# Patient Record
Sex: FEMALE | Race: WHITE | Employment: UNEMPLOYED | URBAN - METROPOLITAN AREA
[De-identification: names, ages, dates, MRNs, and addresses within clinical notes are randomized per-mention and may not be internally consistent; named-entity substitution may affect disease eponyms.]

---

## 2018-10-16 ENCOUNTER — ROUTINE PRENATAL (OUTPATIENT)
Dept: OBGYN CLINIC | Age: 33
End: 2018-10-16

## 2018-10-16 ENCOUNTER — OFFICE VISIT (OUTPATIENT)
Dept: OBGYN CLINIC | Age: 33
End: 2018-10-16

## 2018-10-16 VITALS
SYSTOLIC BLOOD PRESSURE: 96 MMHG | HEIGHT: 67 IN | WEIGHT: 187.4 LBS | RESPIRATION RATE: 18 BRPM | HEART RATE: 76 BPM | OXYGEN SATURATION: 100 % | DIASTOLIC BLOOD PRESSURE: 70 MMHG | BODY MASS INDEX: 29.41 KG/M2

## 2018-10-16 VITALS
RESPIRATION RATE: 18 BRPM | SYSTOLIC BLOOD PRESSURE: 96 MMHG | WEIGHT: 187.4 LBS | HEIGHT: 67 IN | DIASTOLIC BLOOD PRESSURE: 70 MMHG | HEART RATE: 76 BPM | BODY MASS INDEX: 29.41 KG/M2 | OXYGEN SATURATION: 100 %

## 2018-10-16 DIAGNOSIS — N91.2 AMENORRHEA: ICD-10-CM

## 2018-10-16 DIAGNOSIS — N92.6 MISSED MENSES: Primary | ICD-10-CM

## 2018-10-16 DIAGNOSIS — Z3A.31 31 WEEKS GESTATION OF PREGNANCY: ICD-10-CM

## 2018-10-16 LAB
BILIRUB UR QL STRIP: NEGATIVE
GLUCOSE UR-MCNC: NEGATIVE MG/DL
KETONES P FAST UR STRIP-MCNC: NEGATIVE MG/DL
PH UR STRIP: 7 [PH] (ref 4.6–8)
PROT UR QL STRIP: NEGATIVE
SP GR UR STRIP: 1.01 (ref 1–1.03)
UA UROBILINOGEN AMB POC: NORMAL (ref 0.2–1)
URINALYSIS CLARITY POC: CLEAR
URINALYSIS COLOR POC: YELLOW
URINE BLOOD POC: NEGATIVE
URINE LEUKOCYTES POC: NORMAL
URINE NITRITES POC: NEGATIVE

## 2018-10-16 NOTE — MR AVS SNAPSHOT
[address] [id number] [phone] Patient: Greta Spring MRN: [id number] PPV:0/84/1660 Visit Information Date & Time Provider Department Dept. Phone Encounter #  
 10/16/2018  3:00 PM Tricia Licona [phone] [id number] Follow-up Instructions Return in about 2 weeks (around 10/30/2018) for Routine OB Visit on rotation. Upcoming Health Maintenance Date Due  
 PAP AKA CERVICAL CYTOLOGY 3/24/2007 Influenza Age 5 to Adult 8/1/2018 OB 3RD TRIMESTER TDAP 9/12/2018 Allergies as of 10/16/2018  Review Complete On: 10/16/2018 By: Kaiser Burton LPN No Known Allergies Current Immunizations  Never Reviewed Name Date Tdap  Incomplete Not reviewed this visit You Were Diagnosed With   
  
 Codes Comments Missed menses    -  Primary ICD-10-CM: N92.6 ICD-9-CM: 626.4 Amenorrhea     ICD-10-CM: N91.2 ICD-9-CM: 626.0 31 weeks gestation of pregnancy     ICD-10-CM: Z3A.31 
ICD-9-CM: V22.2 Vitals BP Pulse Resp Height(growth percentile) Weight(growth percentile) LMP  
 96/70 76 18 5' 7\" (1.702 m) 187 lb 6.4 oz (85 kg) 03/14/2018 (Exact Date) SpO2 BMI OB Status 100% 29.35 kg/m2 Pregnant BMI and BSA Data Body Mass Index Body Surface Area  
 29.35 kg/m 2 2 m 2 Your Updated Medication List  
  
   
This list is accurate as of 10/16/18  3:18 PM.  Always use your most recent med list.  
  
  
  
  
 PNV66-Iron Fumarate-FA-DSS-DHA 26-1.2- mg Cap Take  by mouth. We Performed the Following AMB POC URINALYSIS DIP STICK AUTO W/ MICRO [98145 CPT(R)] TETANUS, DIPHTHERIA TOXOIDS AND ACELLULAR PERTUSSIS VACCINE (TDAP), IN INDIVIDS. >=7, IM [id number] CPT(R)] Follow-up Instructions Return in about 2 weeks (around 10/30/2018) for Routine OB Visit on rotation. Introducing [facility]! [facility] introduces Searchspace patient portal. Now you can access parts of your medical record, email your doctor's office, and request medication refills online. 1. In your internet browser, go to [email or web address] 2. Click on the First Time User? Click Here link in the Sign In box. You will see the New Member Sign Up page. 3. Enter your Searchspace Access Code exactly as it appears below. You will not need to use this code after youve completed the sign-up process. If you do not sign up before the expiration date, you must request a new code. · Searchspace Access Code: [email or web address] Expires: 1/14/2019  2:04 PM 
 
4. Enter the last four digits of your Social Security Number (xxxx) and Date of Birth (mm/dd/yyyy) as indicated and click Submit. You will be taken to the next sign-up page. 5. Create a Searchspace ID. This will be your Searchspace login ID and cannot be changed, so think of one that is secure and easy to remember. 6. Create a Searchspace password. You can change your password at any time. 7. Enter your Password Reset Question and Answer. This can be used at a later time if you forget your password. 8. Enter your e-mail address. You will receive e-mail notification when new information is available in [address] 9. Click Sign Up. You can now view and download portions of your medical record. 10. Click the Download Summary menu link to download a portable copy of your medical information. If you have questions, please visit the Frequently Asked Questions section of the Searchspace website. Remember, Searchspace is NOT to be used for urgent needs. For medical emergencies, dial 911. Now available from your iPhone and Android! Please provide this summary of care documentation to your next provider. If you have any questions after today's visit, please call [phone].

## 2018-10-16 NOTE — PROGRESS NOTES
Phil Granados is a 29 y.o. female who presents for routine immunizations. She denies any symptoms , reactions or allergies that would exclude them from being immunized today. Risks and adverse reactions were discussed and the VIS was given to them. All questions were addressed. She was observed for 10 min post injection. There were no reactions observed.     Yoav Turcios LPN

## 2018-10-16 NOTE — PROGRESS NOTES
Ms. Edward Delacruz is a G5   31w6d with Estimated Date of Delivery: 12/12/18 presents to the office for a routine  prenatal visit. She denies contractions, leakage of fluid, or vaginal bleeding. She reports normal fetal movement. Visit Vitals    BP 96/70    Pulse 76    Resp 18    Ht 5' 7\" (1.702 m)    Wt 187 lb 6.4 oz (85 kg)    LMP 03/14/2018 (Exact Date)    SpO2 100%    BMI 29.35 kg/m2       A/P  31w6d IUP, normal prenatal visit. 1. Continue routine prenatal care  2. Strict 3rd trimester precautions given. Advised regarding leakage of fluid, contractions, and vaginal bleeding. Fetal kick count instructions given.   3. F/U in 2 weeks  4. TDap today  used

## 2018-10-30 ENCOUNTER — ROUTINE PRENATAL (OUTPATIENT)
Dept: OBGYN CLINIC | Age: 33
End: 2018-10-30

## 2018-10-30 VITALS
BODY MASS INDEX: 29.66 KG/M2 | HEIGHT: 67 IN | SYSTOLIC BLOOD PRESSURE: 108 MMHG | WEIGHT: 189 LBS | DIASTOLIC BLOOD PRESSURE: 74 MMHG | HEART RATE: 82 BPM

## 2018-10-30 DIAGNOSIS — M25.551 RIGHT HIP PAIN: Primary | ICD-10-CM

## 2018-10-30 NOTE — PATIENT INSTRUCTIONS
Weeks 32 to 34 of Your Pregnancy: Care Instructions  Your Care Instructions    During the last few weeks of your pregnancy, you may have more aches and pains. It's important to rest when you can. Your growing baby is putting more pressure on your bladder. So you may need to urinate more often. Hemorrhoids are also common. These are painful, itchy veins in the rectal area. In the 36th week, most women have a test for group B streptococcus (GBS). GBS is a common bacteria that can live in the vagina and rectum. It can make your baby sick after birth. If you test positive, you will get antibiotics during labor. These will keep your baby from getting the bacteria. You may want to talk with your doctor about banking your baby's umbilical cord blood. This is the blood left in the cord after birth. If you want to save this blood, you must arrange it ahead of time. You can't decide at the last minute. If you haven't already had the Tdap shot during this pregnancy, talk to your doctor about getting it. It will help protect your  against pertussis infection. Follow-up care is a key part of your treatment and safety. Be sure to make and go to all appointments, and call your doctor if you are having problems. It's also a good idea to know your test results and keep a list of the medicines you take. How can you care for yourself at home? Ease hemorrhoids  · Get more liquids, fruits, vegetables, and fiber in your diet. This will help keep your stools soft. · Avoid sitting for too long. Lie on your left side several times a day. · Clean yourself with soft, moist toilet paper. Or you can use witch hazel pads or personal hygiene pads. · If you are uncomfortable, try ice packs. Or you can sit in a warm sitz bath. Do these for 20 minutes at a time, as needed. · Use hydrocortisone cream for pain and itching. Two examples are Anusol and Preparation H Hydrocortisone.   · Ask your doctor about taking an over-the-counter stool softener. Consider breastfeeding  · Experts recommend that women breastfeed for 1 year or longer. Breast milk is the perfect food for babies. · Breast milk is easier for babies to digest than formula. And it is always available, just the right temperature, and free. · Breast milk may help protect your child from some health problems.  babies are less likely than formula-fed babies to:  ? Get ear infections, colds, diarrhea, and pneumonia. ? Be obese or get diabetes later in life. · Women who breastfeed have less bleeding after the birth. Their uteruses also shrink back faster. · Some women who breastfeed lose weight faster. Making milk burns calories. · Breastfeeding can lower your risk of breast cancer, ovarian cancer, and osteoporosis. Decide about circumcision for boys  · As you make this decision, it may help to think about your personal, Yazdanism, and family traditions. You get to decide if you will keep your son's penis natural or if he will be circumcised. · If you decide that you would like to have your baby circumcised, talk with your doctor. You can share your concerns about pain. And you can discuss your preferences for anesthesia. Where can you learn more? Go to http://rea-quan.info/. Enter T970 in the search box to learn more about \"Weeks 32 to 34 of Your Pregnancy: Care Instructions. \"  Current as of: November 21, 2017  Content Version: 11.8  © 8548-2209 [employer]wise, [employer]orporated. Care instructions adapted under license by [employer] (which disclaims liability or warranty for this information). If you have questions about a medical condition or this instruction, always ask your healthcare professional. [address] [address] any warranty or liability for your use of this information.

## 2018-10-30 NOTE — PROGRESS NOTES
33w6d. Pt. Very upset about lack of attention paid to her. She states she has been complaining of groin and right hip pain for months. She is frustrated and feels prenatal care is a waste of time. She notes her right groin and hip are very painful after a long day. She feels that pressure improves the pain. She has tried ice, heat, massage, pregnancy belt and takes Tylenol all day. She denies bleeding or abnormal discharge. No palpable defect noted. TTP on right upper mons pubis. Discussed regular pregnancy discomforts. Referral for PT placed. RTO 2 weeks for GBS. Labor precautions reviewed.

## 2018-11-05 ENCOUNTER — TELEPHONE (OUTPATIENT)
Dept: OBGYN CLINIC | Age: 33
End: 2018-11-05

## 2018-11-05 ENCOUNTER — DOCUMENTATION ONLY (OUTPATIENT)
Dept: OBGYN CLINIC | Age: 33
End: 2018-11-05

## 2018-11-05 RX ORDER — PANTOPRAZOLE SODIUM 20 MG/1
20 TABLET, DELAYED RELEASE ORAL DAILY
Qty: 30 TAB | Refills: 2 | OUTPATIENT
Start: 2018-11-05 | End: 2018-12-05

## 2018-11-05 RX ORDER — ONDANSETRON 8 MG/1
8 TABLET, ORALLY DISINTEGRATING ORAL
Qty: 30 TAB | Refills: 2 | OUTPATIENT
Start: 2018-11-05 | End: 2018-12-05

## 2018-11-05 NOTE — PROGRESS NOTES
Pt. Requesting Zofran and anti- emetic. Has tried OTC meds with no relief. Rx sent for Protonix and Zofran.

## 2018-11-05 NOTE — TELEPHONE ENCOUNTER
Patient would like prescription for heartburn and nausea. Over the counter medications not alleviating the issue. Patient unable to  Tolerate drinking water. Patient stated she had the same issues during previous pregnancy.  Patient stated she has tried all the meds her provider had recommended

## 2018-11-06 ENCOUNTER — TELEPHONE (OUTPATIENT)
Dept: OBGYN CLINIC | Age: 33
End: 2018-11-06

## 2018-11-06 NOTE — TELEPHONE ENCOUNTER
Patient called stating that she was at her pharmacy to  her medication and it was not there. (Protonix) I informed the patient that the medication was sent to her pharmacy on 11/5/18. All information on file is correct. Patient stated that she would like the medication to be resent.  Please advise

## 2018-11-06 NOTE — TELEPHONE ENCOUNTER
Patient called today while at Pharmacy , \" per this patient medication was not at [facility]"  Patient very Abrupt with [address] desk \" Patient [facility] .

## 2018-11-06 NOTE — TELEPHONE ENCOUNTER
Called [facility] to verify Rx , medication was not at the Pharmacy verbal order given to Pharmacist   ondansetron (ZOFRAN ODT) 8 mg disintegrating tablet [[id number]]   Order Details   Dose: 8 mg Route: Oral Frequency: EVERY 12 HOURS AS NEEDED for Nausea   Indications of Use: EXCESSIVE VOMITING IN PREGNANCY   Dispense Quantity: 30 Tab Refills: 2 Fills remaining: --           Sig: Take 1 Tab by mouth every twelve (12) hours as needed for Nausea.         pantoprazole (PROTONIX) 20 mg tablet [[id number]]   Order Details   Dose: 20 mg Route: Oral Frequency: DAILY   Dispense Quantity: 30 Tab Refills: 2 Fills remaining: --

## 2018-11-12 LAB
HBSAG, EXTERNAL: NEGATIVE
HIV, EXTERNAL: NEGATIVE
RUBELLA, EXTERNAL: NORMAL
TYPE, ABO & RH, EXTERNAL: NORMAL

## 2018-11-18 LAB — GRBS, EXTERNAL: POSITIVE

## 2018-12-03 ENCOUNTER — HOSPITAL ENCOUNTER (INPATIENT)
Age: 33
LOS: 2 days | Discharge: HOME | End: 2018-12-05
Attending: OBSTETRICS & GYNECOLOGY | Admitting: OBSTETRICS & GYNECOLOGY
Payer: COMMERCIAL

## 2018-12-03 ENCOUNTER — ANESTHESIA (OUTPATIENT)
Dept: LABOR AND DELIVERY | Age: 33
End: 2018-12-03
Payer: COMMERCIAL

## 2018-12-03 ENCOUNTER — ANESTHESIA EVENT (OUTPATIENT)
Dept: LABOR AND DELIVERY | Age: 33
End: 2018-12-03
Payer: COMMERCIAL

## 2018-12-03 LAB
ABO + RH BLD: NORMAL
BASOPHILS # BLD: 0 K/UL (ref 0–0.1)
BASOPHILS NFR BLD: 0 % (ref 0–2)
BLOOD BANK CMNT PATIENT-IMP: NORMAL
BLOOD GROUP ANTIBODIES SERPL: NORMAL
BLOOD GROUP ANTIBODIES SERPL: NORMAL
DIFFERENTIAL METHOD BLD: ABNORMAL
EOSINOPHIL # BLD: 0.1 K/UL (ref 0–0.4)
EOSINOPHIL NFR BLD: 1 % (ref 0–5)
ERYTHROCYTE [DISTWIDTH] IN BLOOD BY AUTOMATED COUNT: 13.4 % (ref 11.6–14.5)
HCT VFR BLD AUTO: 36.6 % (ref 35–45)
HGB BLD-MCNC: 12.6 G/DL (ref 12–16)
LYMPHOCYTES # BLD: 2.7 K/UL (ref 0.9–3.6)
LYMPHOCYTES NFR BLD: 20 % (ref 21–52)
MCH RBC QN AUTO: 31.6 PG (ref 24–34)
MCHC RBC AUTO-ENTMCNC: 34.4 G/DL (ref 31–37)
MCV RBC AUTO: 91.7 FL (ref 74–97)
MONOCYTES # BLD: 1.1 K/UL (ref 0.05–1.2)
MONOCYTES NFR BLD: 8 % (ref 3–10)
NEUTS SEG # BLD: 9.7 K/UL (ref 1.8–8)
NEUTS SEG NFR BLD: 71 % (ref 40–73)
PLATELET # BLD AUTO: 191 K/UL (ref 135–420)
PMV BLD AUTO: 10.3 FL (ref 9.2–11.8)
RBC # BLD AUTO: 3.99 M/UL (ref 4.2–5.3)
SPECIMEN EXP DATE BLD: NORMAL
WBC # BLD AUTO: 13.6 K/UL (ref 4.6–13.2)

## 2018-12-03 PROCEDURE — 74011000258 HC RX REV CODE- 258: Performed by: ADVANCED PRACTICE MIDWIFE

## 2018-12-03 PROCEDURE — 74011000250 HC RX REV CODE- 250

## 2018-12-03 PROCEDURE — 86870 RBC ANTIBODY IDENTIFICATION: CPT

## 2018-12-03 PROCEDURE — 77030007879 HC KT SPN EPDRL TELE -B: Performed by: NURSE ANESTHETIST, CERTIFIED REGISTERED

## 2018-12-03 PROCEDURE — 65270000029 HC RM PRIVATE

## 2018-12-03 PROCEDURE — 85025 COMPLETE CBC W/AUTO DIFF WBC: CPT

## 2018-12-03 PROCEDURE — 74011250637 HC RX REV CODE- 250/637: Performed by: ADVANCED PRACTICE MIDWIFE

## 2018-12-03 PROCEDURE — 74011250636 HC RX REV CODE- 250/636: Performed by: ADVANCED PRACTICE MIDWIFE

## 2018-12-03 PROCEDURE — 86901 BLOOD TYPING SEROLOGIC RH(D): CPT

## 2018-12-03 RX ORDER — MISOPROSTOL 200 UG/1
800 TABLET ORAL
Status: DISCONTINUED | OUTPATIENT
Start: 2018-12-03 | End: 2018-12-04 | Stop reason: HOSPADM

## 2018-12-03 RX ORDER — IBUPROFEN 400 MG/1
800 TABLET ORAL
Status: DISCONTINUED | OUTPATIENT
Start: 2018-12-03 | End: 2018-12-05 | Stop reason: HOSPADM

## 2018-12-03 RX ORDER — METHYLERGONOVINE MALEATE 0.2 MG/ML
0.2 INJECTION INTRAVENOUS AS NEEDED
Status: DISCONTINUED | OUTPATIENT
Start: 2018-12-03 | End: 2018-12-04 | Stop reason: HOSPADM

## 2018-12-03 RX ORDER — SODIUM CHLORIDE 0.9 % (FLUSH) 0.9 %
5-10 SYRINGE (ML) INJECTION EVERY 8 HOURS
Status: DISCONTINUED | OUTPATIENT
Start: 2018-12-03 | End: 2018-12-03

## 2018-12-03 RX ORDER — AMOXICILLIN 250 MG
1 CAPSULE ORAL
Status: DISCONTINUED | OUTPATIENT
Start: 2018-12-03 | End: 2018-12-05 | Stop reason: HOSPADM

## 2018-12-03 RX ORDER — DIPHENHYDRAMINE HYDROCHLORIDE 50 MG/ML
25 INJECTION, SOLUTION INTRAMUSCULAR; INTRAVENOUS
Status: DISCONTINUED | OUTPATIENT
Start: 2018-12-03 | End: 2018-12-03

## 2018-12-03 RX ORDER — OXYTOCIN/RINGER'S LACTATE 20/1000 ML
999 PLASTIC BAG, INJECTION (ML) INTRAVENOUS ONCE
Status: COMPLETED | OUTPATIENT
Start: 2018-12-03 | End: 2018-12-03

## 2018-12-03 RX ORDER — NALOXONE HYDROCHLORIDE 0.4 MG/ML
0.2 INJECTION, SOLUTION INTRAMUSCULAR; INTRAVENOUS; SUBCUTANEOUS AS NEEDED
Status: DISCONTINUED | OUTPATIENT
Start: 2018-12-03 | End: 2018-12-03

## 2018-12-03 RX ORDER — EPHEDRINE SULFATE 50 MG/ML
10 INJECTION, SOLUTION INTRAVENOUS AS NEEDED
Status: DISCONTINUED | OUTPATIENT
Start: 2018-12-03 | End: 2018-12-03

## 2018-12-03 RX ORDER — LIDOCAINE HYDROCHLORIDE 10 MG/ML
20 INJECTION, SOLUTION EPIDURAL; INFILTRATION; INTRACAUDAL; PERINEURAL AS NEEDED
Status: DISCONTINUED | OUTPATIENT
Start: 2018-12-03 | End: 2018-12-04 | Stop reason: HOSPADM

## 2018-12-03 RX ORDER — OXYTOCIN/0.9 % SODIUM CHLORIDE 30/500 ML
0-20 PLASTIC BAG, INJECTION (ML) INTRAVENOUS
Status: DISCONTINUED | OUTPATIENT
Start: 2018-12-03 | End: 2018-12-03

## 2018-12-03 RX ORDER — TERBUTALINE SULFATE 1 MG/ML
0.25 INJECTION SUBCUTANEOUS
Status: DISCONTINUED | OUTPATIENT
Start: 2018-12-03 | End: 2018-12-04 | Stop reason: HOSPADM

## 2018-12-03 RX ORDER — LIDOCAINE HYDROCHLORIDE AND EPINEPHRINE 15; 5 MG/ML; UG/ML
INJECTION, SOLUTION EPIDURAL AS NEEDED
Status: DISCONTINUED | OUTPATIENT
Start: 2018-12-03 | End: 2018-12-03

## 2018-12-03 RX ORDER — CARBOPROST TROMETHAMINE 250 UG/ML
250 INJECTION, SOLUTION INTRAMUSCULAR
Status: DISCONTINUED | OUTPATIENT
Start: 2018-12-03 | End: 2018-12-04 | Stop reason: HOSPADM

## 2018-12-03 RX ORDER — OXYTOCIN/RINGER'S LACTATE 20/1000 ML
125 PLASTIC BAG, INJECTION (ML) INTRAVENOUS CONTINUOUS
Status: DISCONTINUED | OUTPATIENT
Start: 2018-12-03 | End: 2018-12-04 | Stop reason: HOSPADM

## 2018-12-03 RX ORDER — PHENYLEPHRINE HCL IN 0.9% NACL 0.4MG/10ML
100 SYRINGE (ML) INTRAVENOUS AS NEEDED
Status: DISCONTINUED | OUTPATIENT
Start: 2018-12-03 | End: 2018-12-03

## 2018-12-03 RX ORDER — LIDOCAINE HYDROCHLORIDE AND EPINEPHRINE 15; 5 MG/ML; UG/ML
INJECTION, SOLUTION EPIDURAL
Status: COMPLETED | OUTPATIENT
Start: 2018-12-03 | End: 2018-12-03

## 2018-12-03 RX ORDER — PROCHLORPERAZINE EDISYLATE 5 MG/ML
5 INJECTION INTRAMUSCULAR; INTRAVENOUS
Status: DISCONTINUED | OUTPATIENT
Start: 2018-12-03 | End: 2018-12-03

## 2018-12-03 RX ORDER — PROMETHAZINE HYDROCHLORIDE 25 MG/ML
25 INJECTION, SOLUTION INTRAMUSCULAR; INTRAVENOUS
Status: DISCONTINUED | OUTPATIENT
Start: 2018-12-03 | End: 2018-12-05 | Stop reason: HOSPADM

## 2018-12-03 RX ORDER — NALBUPHINE HYDROCHLORIDE 10 MG/ML
10 INJECTION, SOLUTION INTRAMUSCULAR; INTRAVENOUS; SUBCUTANEOUS
Status: DISCONTINUED | OUTPATIENT
Start: 2018-12-03 | End: 2018-12-04 | Stop reason: HOSPADM

## 2018-12-03 RX ORDER — SODIUM CHLORIDE, SODIUM LACTATE, POTASSIUM CHLORIDE, CALCIUM CHLORIDE 600; 310; 30; 20 MG/100ML; MG/100ML; MG/100ML; MG/100ML
125 INJECTION, SOLUTION INTRAVENOUS CONTINUOUS
Status: DISCONTINUED | OUTPATIENT
Start: 2018-12-03 | End: 2018-12-04 | Stop reason: HOSPADM

## 2018-12-03 RX ORDER — CASTOR OIL 100 %
OIL (ML) ORAL
Status: DISCONTINUED
Start: 2018-12-03 | End: 2018-12-03

## 2018-12-03 RX ORDER — ACETAMINOPHEN 325 MG/1
650 TABLET ORAL
Status: DISCONTINUED | OUTPATIENT
Start: 2018-12-03 | End: 2018-12-05 | Stop reason: HOSPADM

## 2018-12-03 RX ORDER — BUPIVACAINE HYDROCHLORIDE 2.5 MG/ML
INJECTION, SOLUTION EPIDURAL; INFILTRATION; INTRACAUDAL AS NEEDED
Status: DISCONTINUED | OUTPATIENT
Start: 2018-12-03 | End: 2018-12-03 | Stop reason: HOSPADM

## 2018-12-03 RX ORDER — SALICYLIC ACID
90 POWDER (GRAM) MISCELLANEOUS ONCE
Status: COMPLETED | OUTPATIENT
Start: 2018-12-03 | End: 2018-12-03

## 2018-12-03 RX ORDER — SODIUM CHLORIDE 0.9 % (FLUSH) 0.9 %
5-10 SYRINGE (ML) INJECTION AS NEEDED
Status: DISCONTINUED | OUTPATIENT
Start: 2018-12-03 | End: 2018-12-03

## 2018-12-03 RX ADMIN — BUPIVACAINE HYDROCHLORIDE 4 ML: 2.5 INJECTION, SOLUTION EPIDURAL; INFILTRATION; INTRACAUDAL at 19:03

## 2018-12-03 RX ADMIN — BUPIVACAINE HYDROCHLORIDE 4 ML: 2.5 INJECTION, SOLUTION EPIDURAL; INFILTRATION; INTRACAUDAL at 19:06

## 2018-12-03 RX ADMIN — Medication 19980 MILLI-UNITS/HR: at 21:52

## 2018-12-03 RX ADMIN — SODIUM CHLORIDE, SODIUM LACTATE, POTASSIUM CHLORIDE, AND CALCIUM CHLORIDE 125 ML/HR: 600; 310; 30; 20 INJECTION, SOLUTION INTRAVENOUS at 17:52

## 2018-12-03 RX ADMIN — LIDOCAINE HYDROCHLORIDE AND EPINEPHRINE 3 ML: 15; 5 INJECTION, SOLUTION EPIDURAL at 19:00

## 2018-12-03 RX ADMIN — Medication 10 ML/HR: at 19:07

## 2018-12-03 RX ADMIN — PENICILLIN G POTASSIUM 2.5 MILLION UNITS: 20000000 POWDER, FOR SOLUTION INTRAVENOUS at 19:43

## 2018-12-03 RX ADMIN — SODIUM CHLORIDE 5 MILLION UNITS: 900 INJECTION INTRAVENOUS at 15:45

## 2018-12-03 RX ADMIN — SODIUM CHLORIDE, SODIUM LACTATE, POTASSIUM CHLORIDE, AND CALCIUM CHLORIDE 125 ML/HR: 600; 310; 30; 20 INJECTION, SOLUTION INTRAVENOUS at 15:45

## 2018-12-03 RX ADMIN — CASTOR OIL 59 ML: 1 LIQUID ORAL at 22:06

## 2018-12-03 NOTE — PROGRESS NOTES
Labor Progress Note [place] is reporting more intense contractions and requesting epidural. Fetal heart rate and contraction pattern evaluated, patient examined. Physical Exam: 
Cervical Exam:  4.5/90 %/-3/  
Membranes:  Intact Uterine Activity: Frequency: Every 1-5 minutes and Duration: 50-60 seconds Fetal Heart Rate: Baseline: 120 per minute Variability: moderate Accelerations: yes Decelerations: none Assessment: IUP 38w5d; FHR Category I; early labor Plan: Continue to monitor for maternal and fetal wellbeing, anestheia called for epidural, discussed ROM to augment labor when [place] is comfortable, encouraged maternal repositioning to facilitate fetal descent; anticipate . Trung Spring CNM 
12/3/2018 
6:37 PM

## 2018-12-03 NOTE — ROUTINE PROCESS
1430 - Pt arrived ambulatory with  with c/o ctx that are about every 5 minutes that have been increasing in intensity for the last 2 hours. Pt is able to talk through contractions, not sure if she is in labor as 2 previous births were inductions. Pt reports she had her membranes swept earlier today in the office, reports she was 4/70 in office. Denies LOF or VB, other than spotting from membrane sweeping earlier. 1444 - SVE by Marci Bailey; 4/70/-3. Plan is to do intermittent monitoring, allow pt to walk in between monitoring. Also plan to start PIV d/t GBS positive status and start PenG. 1530 - PIV in left forearm, blood drawn and sent to lab. 1545 - LR and PenG 5 million started. 1550 - pt off monitor and walking the halls with . Pt aware to return within 30 to 45 minutes to check progress of IV Pen G infusion. 1650 - SVE by Marci Bailey; 4/90/-3. Provider offered enema to increase intensity of contractions. Pt agreeable 1705 - Pt in left lateral position with knees up to get enema. Pt tolerated 1L of soap suds enema. Up to bathroom afterwards. 1727 - Pt returned from bathroom. On ball at bedside. Pt has no questions/concerns at this time. Will continue to monitor. 1730 - Blood bank called to ask when pt received her RhoGam shot. Pt was with a different practice when she received the shot. Pt states had an appt there on 09/18/18 and she believes that was when she got the shot. Pt states she gave current practice a copy of the card earlier but did not bring it with her, she didn't think she would need it. 1745 - This RN called the blood bank and updated them on the date of pts rhogam shot. [phone] - Pt requesting she be checked so she can get the all clear for an epidural if possible. 1832 - SVE by Vic Stevens; 4-5/90/-3. 
1032 - anesthesia paged. Anesthesia called back. Platelets 605, they will be up soon 1849 - anesthesia in room.   RN in room and to remains in room until otherwise noted. 1906 - Start of anesthesia infusion. No time out done for epidural 
1911 - BP 89/61. LR bolus started. Mendel Henri, CRNA in room, noted pts BP and asked for 500mL bolus which has already been initiated by this RN. 
2522 - This RN gave bedside report to night shift. This RN now out of the room. Pt BP stable. Bedside and Verbal shift change report given to Janki Skaggs RN (oncoming nurse) by [employer], RN (offgoing nurse). Report included the following information SBAR, Procedure Summary, MAR and Recent Results.

## 2018-12-03 NOTE — PROGRESS NOTES
Labor Progress Note [place] is comfortable, noticing more intense contractions. Fetal heart rate and contraction pattern evaluated, patient examined. Minimal change since arrival. Discussed enema to augment contractions and maternal repositioning to facilitate fetal descent, she is in agreement. She plans to have an epidural in labor. Physical Exam: 
Cervical Exam:  /-3/ , mid position and soft Membranes:  Intact Uterine Activity: Frequency: Every 3 minutes and Duration: 60 seconds Fetal Heart Rate: Baseline: 125 per minute Variability: moderate Accelerations: yes Decelerations: none Assessment: IUP 38w5d; FHR Category I; GBS pos; early labor Plan: Continue to monitor for maternal and fetal wellbeing, enema to augment labor, epidural as desired; anticipate . Trung Spring CNM 
12/3/2018 
5:03 PM

## 2018-12-03 NOTE — H&P
History & Physical 
 
Name: Prateek Alas MRN: [id number]  SSN: [id number] YOB: 1985  Age: 29 y.o. Sex: female Subjective:  
 
 
Estimated Date of Delivery: 18 OB History  Para Term  AB Living 5 2 2   2 2 SAB TAB Ectopic Molar Multiple Live Births 2         2  
  
 
  
2016  
 
OB HISTORY Caity Roberts presents with contractions for the past 2.5 hours, with increasing frequency and intensity. She was seen in the office and membranes were stripped. She has seen spotting since then. +FM. Denies LOF. Nima Ledezma is accompanied by her , Marquez Rebollar. Ms. April Guzman is admitted with pregnancy at 39+3w for Increasingly painful contractions. Prenatal course was complicated by suspected bicuspid aorta, 2-vessel cord, and double nuchal cord at 37weeks. Prenatal care has been followed by [facility] [address] [place] [address] starting at 36+5wga, transfer from another practice. Admitted to [phone]. Nima Ledezma plans to have an epidural for pain management. Discussed student in L&D and she consents to having a student observe. Past Medical History:  
Diagnosis Date  Right hip pain 10/30/2018 No past surgical history on file. Social History Occupational History  Not on file Tobacco Use  Smoking status: Not on file Substance and Sexual Activity  Alcohol use: Not on file  Drug use: Not on file  Sexual activity: Not on file No family history on file. No Known Allergies Prior to Admission medications Medication Sig Start Date End Date Taking? Authorizing Provider  
pantoprazole (PROTONIX) 20 mg tablet Take 1 Tab by mouth daily. 18   Wendy Meade MD  
ondansetron (ZOFRAN ODT) 8 mg disintegrating tablet Take 1 Tab by mouth every twelve (12) hours as needed for Nausea. 18   Wendy Meade MD  
PNV66-Iron Fumarate-FA-DSS-DHA 26-1.2- mg cap Take  by mouth.     Provider, Historical  
  
 
 Review of Systems: Pertinent items are noted in HPI. Objective:  
 
Vitals: 
Vitals:  
 18 1423 BP: 111/77 Pulse: 88 Resp: 18 Temp: 98 °F (36.7 °C) Physical Exam: 
Patient in no acute distress Abdomen: Gravid, nontender Cervix: 4/70/-3, unchanged from exam in office this morning FHR:Baseline: 125per minute Variability: moderate Accelerations: yes Decelerations: none Contractions: Every 2 minutes EFW 7.5 # by Leopold's 
 
Prenatal Labs: O Neg. Rub imm. Prenatal labs neg. Group B Strep was positive, will treat prophylactically with penicillin. Assessment/Plan:  
Assessment: IUP @ 39w3d; FHT Category I; Vertex presentation, GBS pos Patient Active Problem List  
Diagnosis Code  Right hip pain M25.551  Need for rhogam due to Rh negative mother Z35.13  Labor and delivery indication for care or intervention O75.9 Plan: Admit for Labor  Continue expectant management. May be off monitor for walking and will re-evaluate for cervical change. PenG prophylaxis for GBS+. Epidural as desired. Anticipate . Dr. Jordana Geller MD notified. Signed By:  Alexy Adhikari CNM  December 3, 2018 2:55 PM

## 2018-12-04 LAB
HCT VFR BLD AUTO: 35.3 % (ref 35–45)
HGB BLD-MCNC: 11.5 G/DL (ref 12–16)

## 2018-12-04 PROCEDURE — 85014 HEMATOCRIT: CPT

## 2018-12-04 PROCEDURE — 76060000078 HC EPIDURAL ANESTHESIA

## 2018-12-04 PROCEDURE — 85461 HEMOGLOBIN FETAL: CPT

## 2018-12-04 PROCEDURE — 74011250637 HC RX REV CODE- 250/637: Performed by: ADVANCED PRACTICE MIDWIFE

## 2018-12-04 PROCEDURE — 74011250636 HC RX REV CODE- 250/636: Performed by: ADVANCED PRACTICE MIDWIFE

## 2018-12-04 PROCEDURE — 75410000000 HC DELIVERY VAGINAL/SINGLE

## 2018-12-04 PROCEDURE — 75410000003 HC RECOV DEL/VAG/CSECN EA 0.5 HR

## 2018-12-04 PROCEDURE — 65270000029 HC RM PRIVATE

## 2018-12-04 PROCEDURE — 36415 COLL VENOUS BLD VENIPUNCTURE: CPT

## 2018-12-04 PROCEDURE — 85018 HEMOGLOBIN: CPT

## 2018-12-04 PROCEDURE — 86901 BLOOD TYPING SEROLOGIC RH(D): CPT

## 2018-12-04 PROCEDURE — 75410000002 HC LABOR FEE PER 1 HR

## 2018-12-04 PROCEDURE — 77010026065 HC OXYGEN MINIMUM MEDICAL AIR

## 2018-12-04 RX ADMIN — ACETAMINOPHEN 650 MG: 325 TABLET, FILM COATED ORAL at 20:06

## 2018-12-04 RX ADMIN — HUMAN RHO(D) IMMUNE GLOBULIN 0.3 MG: 300 INJECTION, SOLUTION INTRAMUSCULAR at 18:09

## 2018-12-04 RX ADMIN — ACETAMINOPHEN 650 MG: 325 TABLET, FILM COATED ORAL at 16:05

## 2018-12-04 RX ADMIN — IBUPROFEN 800 MG: 400 TABLET ORAL at 17:07

## 2018-12-04 RX ADMIN — IBUPROFEN 800 MG: 400 TABLET ORAL at 00:31

## 2018-12-04 RX ADMIN — ACETAMINOPHEN 650 MG: 325 TABLET, FILM COATED ORAL at 12:07

## 2018-12-04 RX ADMIN — ACETAMINOPHEN 650 MG: 325 TABLET, FILM COATED ORAL at 05:35

## 2018-12-04 RX ADMIN — IBUPROFEN 800 MG: 400 TABLET ORAL at 08:55

## 2018-12-04 NOTE — ANESTHESIA POSTPROCEDURE EVALUATION
* No procedures listed *. Anesthesia Post Evaluation Multimodal analgesia: multimodal analgesia not used between 6 hours prior to anesthesia start to PACU discharge Patient location during evaluation: bedside Patient participation: complete - patient participated Level of consciousness: awake and alert Pain management: satisfactory to patient Airway patency: patent Anesthetic complications: no 
Cardiovascular status: stable Respiratory status: room air Hydration status: stable Post anesthesia nausea and vomiting:  none Visit Vitals /64 (BP 1 Location: Left arm, BP Patient Position: At rest;Supine) Pulse 64 Temp 36.7 °C (98 °F) Resp 16 SpO2 97% Breastfeeding? Unknown

## 2018-12-04 NOTE — PROGRESS NOTES
Progress Note Patient: Jason Sepulveda MRN: [id number] YOB: 1985  Age: 29 y.o. Subjective:  
 
Postpartum Day: 1 The patient is feeling well. Pain is  well controlled with current medications. Urinary output is adequate. Baby is feeding via breast without difficulty. Did not do well with last 2 babies, but doing well this time Objective:  
  
Patient Vitals for the past 12 hrs: 
 Temp Pulse Resp BP SpO2  
12/04/18 0245 98 °F (36.7 °C) 64 16 102/64 97 % 12/04/18 0148 97.8 °F (36.6 °C) 70 18 101/68 100 % General:    alert Lochia:  appropriate Uterine Fundus:   firm @ umbilicus Perineum:  well-approximated DVT Evaluation:  Negative Shahrzad's sign. Lab/Data Review: 
Recent Results (from the past 24 hour(s)) CBC WITH AUTOMATED DIFF Collection Time: 12/03/18  3:20 PM  
Result Value Ref Range WBC 13.6 (H) 4.6 - 13.2 K/uL  
 RBC 3.99 (L) 4.20 - 5.30 M/uL  
 HGB 12.6 12.0 - 16.0 g/dL HCT 36.6 35.0 - 45.0 % MCV 91.7 74.0 - 97.0 FL  
 MCH 31.6 24.0 - 34.0 PG  
 MCHC 34.4 31.0 - 37.0 g/dL  
 RDW 13.4 11.6 - 14.5 % PLATELET 210 818 - 774 K/uL MPV 10.3 9.2 - 11.8 FL  
 NEUTROPHILS 71 40 - 73 % LYMPHOCYTES 20 (L) 21 - 52 % MONOCYTES 8 3 - 10 % EOSINOPHILS 1 0 - 5 % BASOPHILS 0 0 - 2 %  
 ABS. NEUTROPHILS 9.7 (H) 1.8 - 8.0 K/UL  
 ABS. LYMPHOCYTES 2.7 0.9 - 3.6 K/UL  
 ABS. MONOCYTES 1.1 0.05 - 1.2 K/UL  
 ABS. EOSINOPHILS 0.1 0.0 - 0.4 K/UL  
 ABS. BASOPHILS 0.0 0.0 - 0.1 K/UL  
 DF AUTOMATED    
TYPE & SCREEN Collection Time: 12/03/18  3:20 PM  
Result Value Ref Range Crossmatch Expiration 12/06/2018 ABO/Rh(D) O NEGATIVE Antibody screen POS Antibody ID anti-D, recent RHIG injection Comment LAST RHOGAM GIVEN 9/18/18 PER ARJUN ROSALES RN, 3LD, 1748 ON 12/3/18 BY [facility] All lab results for the last 24 hours reviewed. Assessment:  
 
Delivery: spontaneous vaginal delivery Plan: Doing well postpartum vaginal delivery. Continue current postpartum care. Encouraged hydration, nutrition and ambulation. Plan DC home tomorrow. Signed By: Bimal Leon CNM December 4, 2018

## 2018-12-04 NOTE — PROGRESS NOTES
SBAR given by RN [employer] to include MAR, KARDEX, epidural procedure summary, and current POC at bedside. Introductions given, white board updated. 1920: Pt reports pain 0/10. Explained gracia catheter placement, sve unchanged. 1930: pt complaining of difficulty swallowing, pt sat in high fowlers, right hip roll, o2 applied via nrb, crna paged, VS remained within normal limits. CNM at bedside to eval.  
1940: Epidural turned off. 
1943: 2nd dose of PCN hung. 1945: CRNA at .  
[id number] Epidural restarted with a continous rate of 8. MIKE Marshall made aware 0030 Pt up to bathroom Voided Quanity sufficient. Pericare demonstrated. Return Demonstration appropriate. Tolerating regular diet. 0100 Pt ambulating to [facility] room 3410 without diffilculty. Report given to [employer]

## 2018-12-04 NOTE — ROUTINE PROCESS
Verbal shift change report given to Virginie Greenfield RN (oncoming nurse) by Rafael Deshpande. Rohit Millan (offgoing nurse). Report included the following information Kardex, Intake/Output, MAR and Recent Results. 0845--assessment done--voiding without difficulty--denies weakness when up--shower encouraged----effective pain management with Tylenol and Motrin--nipples are reddened and sore--Lansinoh cream given--po fluids encouraged. 0915--latch observed--looks good. 1830--vital signs stable--voiding qs--effective pain management with Motrin and Tylenol--breast feeding is going well.

## 2018-12-04 NOTE — PROGRESS NOTES
2000:  at bedside, pt talking about football, verbalizes anxiety this pregnancy r/t infant and delivery. Pt states she is comfortable at this time. Pt and spouse left with callbell in reach, pt will call out with any concerns. 2136: Pt called out, feeling continuous pressure, CNM to bedside, C/C. Room prepped for delivery, peds and nursery called. 2140: pushing initiated. 2142: Pt pushing effectively. 2146: delivery of viable female infant with lusty cry.  
2152: delivery of placenta 
2200: L&D recovery initiated.

## 2018-12-04 NOTE — LACTATION NOTE
Mom states baby has nursed well 2-3 times. Mom had problems breastfeeding with first 2 children and ended up bottle feeding. Discussed latch, nursing pattern/expectations, size of stomach, colostrum, cluster feeding. Offered help with feedings. Info sheet, daily log and resource list given.

## 2018-12-04 NOTE — ANESTHESIA PREPROCEDURE EVALUATION
Anesthetic History No history of anesthetic complications Review of Systems / Medical History Patient summary reviewed, nursing notes reviewed and pertinent labs reviewed Pulmonary Within defined limits Neuro/Psych Within defined limits Cardiovascular Within defined limits GI/Hepatic/Renal 
  
GERD: well controlled Endo/Other Within defined limits Other Findings Physical Exam 
 
Airway Mallampati: II 
TM Distance: 4 - 6 cm Neck ROM: normal range of motion Cardiovascular Regular rate and rhythm,  S1 and S2 normal,  no murmur, click, rub, or gallop Dental 
No notable dental hx Pulmonary Breath sounds clear to auscultation Abdominal 
GI exam deferred Other Findings Anesthetic Plan ASA: 2 Anesthesia type: epidural 
 
 
 
 
 
Anesthetic plan and risks discussed with: Patient and Family

## 2018-12-04 NOTE — PROGRESS NOTES
Verbal shift change report given to DARLENE Aguilar RN (oncoming nurse) by Curtis Clark RN (offgoing nurse). Report included the following information Intake/Output, MAR, Recent Results and Med Rec Status. 80- patient asleep in bed. Vitals stable. Patient denies any need for pain meds at this time. Discussed plan of care for patient and baby for tonight. Mom reports good feeding for baby, encouraged patient to call for help with breast feeding if needed. 0530- woke patient up to remind her it was time to feed baby. Patient complaint of soreness. Requesting pain meds

## 2018-12-04 NOTE — ANESTHESIA PROCEDURE NOTES
Epidural Block Start time: 12/3/2018 6:45 PM 
End time: 12/3/2018 7:10 PM 
Performed by: Av Mello CRNA Authorized by: Av Mello CRNA Pre-Procedure Indication: labor epidural   
Preanesthetic Checklist: patient identified, risks and benefits discussed, anesthesia consent, site marked, patient being monitored, timeout performed and anesthesia consent Timeout Time: 18:50 Epidural:  
Patient position:  Seated Prep region:  Lumbar Prep: Betadine Location:  L3-4 Needle and Epidural Catheter:  
Needle Type:  Tuohy Needle Gauge:  18 G Injection Technique:  Loss of resistance using saline Attempts:  1 Catheter Size:  20 G Catheter at Skin Depth (cm):  14 
Depth in Epidural Space (cm):  7 Events: no blood with aspiration, no cerebrospinal fluid with aspiration, no paresthesia and negative aspiration test   
Test Dose:  Negative Assessment:  
Catheter Secured:  Tegaderm and tape Insertion:  Uncomplicated Patient tolerance:  Patient tolerated the procedure well with no immediate complications

## 2018-12-04 NOTE — L&D DELIVERY NOTE
Delivery Summary      Baby girl TBD  Maternal pushing efforts moved fetal head to . Nursery and Peds at bedside for delivery. Spontaneous, controlled vaginal birth of vigorous female infant. Head delivered OA position, nuchal cord x2, and clear fluid. Body delivered via somersault maneuver to maternal thigh then cord was reduced.  to mothers abdomen, crying and pink with stimulation. Bulb suctioned by Dr. Ernie Wilson. After placental transfusion, cord was doubly clamped and cut by father. Infant moved to warmer for evaluation by Peds. Placenta delivered spontaneously and intact via Joseph Porteous, with 2-vessel, centrally inserted cord. Fundus firm, with minimal bleeding. Perineum and vagina inspected and were found to be intact. Hemostasis maintained. EBL 200cc. Mother and infant stable, skin to skin, recovering in LDR. Dr. Rishi Barrios MD notified of this birth. Chika Noriega CNM    Patient: Wallace Ross MRN: [id number]  SSN: [id number]    YOB: 1985  Age: 29 y.o. Sex: female       Information for the patient's :  Chloe Cuellar [[id number]]       Labor Events:    Labor: No   Rupture Date: 12/3/2018   Rupture Time: 8:53 PM   Rupture Type AROM   Amniotic Fluid Volume:  Moderate    Amniotic Fluid Description: Clear None   Induction: None       Augmentation: AROM   Labor Events: None     Cervical Ripening:     None     Delivery Events:  Episiotomy: None   Laceration(s): None     Repaired: None    Number of Repair Packets:     Suture Type and Size: None     Estimated Blood Loss (ml): 200ml       Delivery Date: 12/3/2018    Delivery Time: 9:46 PM  Delivery Type: Vaginal, Spontaneous  Sex:  Female     Gestational Age: 38w3d   Delivery Clinician:  Chika Noriega  Living Status: Living   Delivery Location: L&D 3419          APGARS  One minute Five minutes Ten minutes   Skin color: 0   1        Heart rate: 2   2        Grimace: 2   2        Muscle tone: 2   2        Breathin 2        Totals: 8   9            Presentation: Vertex    Position: Left Occiput Anterior  Resuscitation Method:  Suctioning-bulb;Suctioning-deep; Tactile Stimulation     Meconium Stained: None      Cord Information: 2 Vessels  Complications: Nuchal Cord With Compressions  Cord around:    Delayed cord clamping? Yes  Cord clamped date/time:12/3/2018  9:49 PM  Disposition of Cord Blood: Lab    Blood Gases Sent?: No    Placenta:  Date/Time: 12/3/2018  9:52 PM  Removal: Spontaneous      Appearance: Normal;Intact     [place] Measurements:  Birth Weight:        Birth Length:        Head Circumference:        Chest Circumference:       Abdominal Girth:       Other Providers:   ;NORIS FRANCE;;;;PEGGY QUINN;;;ADRIANA RAMÍREZ;;DELROY COMBS;CASSIA ENAMORADO, Obstetrician;Primary Nurse;Primary  Nurse;Nicu Nurse;Neonatologist;Anesthesiologist;Crna;Nurse Practitioner;Midwife;Nursery Nurse;Primary Nurse;Pediatrician           Group B Strep:   Lab Results   Component Value Date/Time    Carlytreseferino, External POSITIVE 2018

## 2018-12-04 NOTE — PROGRESS NOTES
Labor Progress Note Fetal heart rate and contraction pattern evaluated, patient examined. Epidural has been off, Amanda De La Cruz reports feeling contractions but comfortable; chest numbness is resolved. Patient Vitals for the past 1 hrs: 
 BP Temp Pulse 183  97.8 °F (36.6 °C)   
18 98/65  72 Physical Exam: 
Cervical Exam:  4.5/90 %/-3/  
Membranes:  Artificial Rupture of Membranes; Amniotic Fluid: large amount of clear fluid Uterine Activity: Frequency: Every 3 minutes and Duration: 60 seconds Fetal Heart Rate: Baseline: 120 per minute Variability: moderate Accelerations: yes Decelerations: none Assessment: IUP 38w5d; FHR Category I; early labor; AROM clear fluid Plan: Continue to monitor for maternal and fetal wellbeing, maternal repositioning to facilitate fetal descent, anticipate . Alexy Adhikari CNM 
12/3/2018 
9:01 PM

## 2018-12-05 VITALS
SYSTOLIC BLOOD PRESSURE: 109 MMHG | TEMPERATURE: 98.4 F | OXYGEN SATURATION: 100 % | RESPIRATION RATE: 17 BRPM | DIASTOLIC BLOOD PRESSURE: 62 MMHG | HEART RATE: 68 BPM

## 2018-12-05 LAB
ABO + RH BLD: NORMAL
ABO + RH BLDCO: NORMAL
BLD PROD TYP BPU: NORMAL
BPU ID: NORMAL
CALLED TO:,BCALL1: NORMAL
FETAL SCREEN,FMHS: NORMAL
STATUS OF UNIT,%ST: NORMAL
UNIT DIVISION, %UDIV: 0

## 2018-12-05 PROCEDURE — 74011250637 HC RX REV CODE- 250/637: Performed by: ADVANCED PRACTICE MIDWIFE

## 2018-12-05 RX ORDER — IBUPROFEN 800 MG/1
800 TABLET ORAL
Qty: 60 TAB | Refills: 1 | Status: SHIPPED | OUTPATIENT
Start: 2018-12-05

## 2018-12-05 RX ADMIN — IBUPROFEN 800 MG: 400 TABLET ORAL at 09:35

## 2018-12-05 RX ADMIN — ACETAMINOPHEN 650 MG: 325 TABLET, FILM COATED ORAL at 04:11

## 2018-12-05 RX ADMIN — DOCUSATE SODIUM AND SENNOSIDES 1 TABLET: 8.6; 5 TABLET, FILM COATED ORAL at 09:42

## 2018-12-05 RX ADMIN — IBUPROFEN 800 MG: 400 TABLET ORAL at 01:17

## 2018-12-05 NOTE — LACTATION NOTE
Mom states baby has nursed well but, her nipples are very sore. Helped position baby in cradle hold. Baby latched well with help. Discussed latch, positions, sore nipple management - given gel pad. Mom does not remember milk coming in with previous children whom she did not nurse. Discussed milk coming in, carefully watching wet and dirty diapers. Reviewed outpatient lactation resources. Encouraged to call with questions.

## 2018-12-05 NOTE — DISCHARGE SUMMARY
[address] [facility]  [address] [facility]  [phone]       Obstetrical Discharge Summary     Patient ID:  Jimi Smith  [id number]  59 y.o.  1985    Admit Date: 12/3/2018    Discharge Date: 2018     Admitting Physician: Rubi Valerio MD     Admission Diagnoses: Labor and delivery indication for care or intervention [O75.9]    Final Diagnosis: [email or web address] Delivered    Additional Diagnoses:Present on Admission:   (Resolved) Labor and delivery indication for care or intervention         OB History      Para Term  AB Living    5 3 3   2 3    SAB TAB Ectopic Molar Multiple Live Births    2       0 3        Lab Results   Component Value Date/Time    Rubella, External IMMUNE 2018    GrBStrep, External POSITIVE 2018       Baby link  Information for the patient's :  Pantera Reddy [[id number]]     Delivery Type: Vaginal, Spontaneous   Delivery Date: 12/3/2018   Delivery Time: 9:46 PM     Birth Weight: 3.225 kg     Sex:  female  Delivery Clinician:  Dulce Peña   Gestational Age: Gestational Age: 44w7d    Presentation: Vertex   Position: Left  Occiput  Anterior     Apgars were 8  and 9      Resuscitation Method: Suctioning-bulb;Suctioning-deep; Tactile Stimulation     Meconium Stained: None  Living Status: Living       Placenta Date/Time: 12/3/2018  9:52 PM   Placenta Removal: Spontaneous   Placenta Appearance: Vertex [1]     Cord Information: 2 Vessels    Cord Events: Nuchal Cord With Compressions       Cord Blood Sent?:       Blood Gases Sent?:  No      Infant Information:   Information for the patient's :  Pantera Reddy [[id number]]         Baby Procedures:    Information for the patient's :  Pantera Reddy [[id number]]        Diet:  Regular  Feeding Method: Infant Feeding: Breastmilk, Formula    Vitals:    Patient Vitals for the past 8 hrs:   BP Temp Pulse Resp SpO2   18 0430 98/59 98.3 °F (36.8 °C) (!) 51 16 99 %     Temp (24hrs), Av.3 °F (36.8 °C), Min:97.7 °F (36.5 °C), Max:98.9 °F (37.2 °C)      Vital signs stable, afebrile. Exam:  Patient is in good general condition. Emotionally: appears to be stable  Fundus:  Firm and not tender. Lower extremities are negative for swelling, cords or tenderness. Nipples with mild nipple breakdown and tenderness. Lab/Data Review:  CBC:   Lab Results   Component Value Date/Time    WBC 13.6 (H) 2018 03:20 PM    RBC 3.99 (L) 2018 03:20 PM    HGB 11.5 (L) 2018 07:49 AM    HCT 35.3 2018 07:49 AM    PLATELET 378  03:20 PM     Lab results reviewed. For significant abnormal values and values requiring intervention, see assessment and plan. Activity: as tolerated    Discharge Instructions: Nothing in vagina, no heavy lifting or exercise for 6 weeks. No driving for 1 week or while taking narcotics. SITZ bath for perineal discomfort. F/U 6 weeks post partum check. Call for heavy bleeding, temperature over 101 degrees, heavy vaginal bleeding, foul vaginal odor or worsening abdominal pain. Medications:   Current Discharge Medication List      START taking these medications    Details   ibuprofen (MOTRIN) 800 mg tablet Take 1 Tab by mouth every eight (8) hours as needed. Qty: 60 Tab, Refills: 1         CONTINUE these medications which have NOT CHANGED    Details   PNV66-Iron Fumarate-FA-DSS-DHA 26-1.2- mg cap Take  by mouth. STOP taking these medications       pantoprazole (PROTONIX) 20 mg tablet Comments:   Reason for Stopping:         ondansetron (ZOFRAN ODT) 8 mg disintegrating tablet Comments:   Reason for Stopping:                 Condition at discharge: Stable and doing well.   Disposition: Home    2018  Sarita Ruvalcaba

## 2018-12-05 NOTE — DISCHARGE INSTRUCTIONS

## 2018-12-05 NOTE — ROUTINE PROCESS
Verbal shift change report given to Helga Hand RN (oncoming nurse) by Derick Jon. Markie Bird (offgoing nurse). Report included the following information Kardex, Intake/Output, MAR and Recent Results. 0925--assessment done--discharge planned for today--POC for discharge discussed--verbalizes understanding. 1210--discharge instructions reviewed and signed 1235--discharged via wheelchair with baby in car seat carrier. Baby transported home in a car seat.

## 2018-12-05 NOTE — PROGRESS NOTES
Verbal shift change report given to DARLENE Mccrary RN (oncoming nurse) by Mary Aragon RN (offgoing nurse). Report included the following information Intake/Output, MAR, Recent Results and Med Rec Status. 1945- vitals stable. Discussed plan of care for tonight for mom and baby. Discussed lactation. Patient is very sore from breast feeding. Encouraged patient to stick with breast feeding and call for assistance. Nipple cream being used. Discussed pain management, patient medicated for pain. 2300- patient asleep in bed baby in bassinet at moms bedside 
 
0000- woke patient up and reminded her it was time to feed baby 0115- medicated for pain. 0430- vitals stable. Mom reports good feeds for baby, still painful though. Patient denies any needs at this time.

## 2019-02-26 ENCOUNTER — TELEPHONE (OUTPATIENT)
Dept: OBGYN CLINIC | Age: 34
End: 2019-02-26

## 2019-02-26 NOTE — TELEPHONE ENCOUNTER
Spoke with patient's  last week regarding some billing issues. [facility] for Mr. Yesenia Pisano today letting him know that the issue has not been resolved and that I'm still working on it.

## 2019-05-22 ENCOUNTER — HOSPITAL ENCOUNTER (OUTPATIENT)
Dept: LAB | Age: 34
Discharge: HOME | End: 2019-05-22
Payer: COMMERCIAL

## 2019-05-22 PROCEDURE — 87624 HPV HI-RISK TYP POOLED RSLT: CPT

## 2019-05-22 PROCEDURE — 88175 CYTOPATH C/V AUTO FLUID REDO: CPT

## 2023-11-01 NOTE — PROGRESS NOTES
Labor Progress Note Called to room by RN. Pt is s/p epidural and c/o difficulty breathing. Patient seen, fetal heart rate and contraction pattern evaluated. CRNA in room to evaluate, epidural off, pt sitting up and O2 applied. VS stable. Discussed high epidural and sensation of difficulty breathing, Tita Solis states she is starting to feel better. Epidural has improved feeling of contractions. Patient Vitals for the past 1 hrs: 
 BP Pulse SpO2  
18 1921 95/62 69 100% Physical Exam: 
Cervical Exam:  4.5/90 %/-3/  
Membranes:  Intact Uterine Activity: Frequency: Every 3-4 minutes and Duration: 60 seconds Fetal Heart Rate: Baseline: 120 per minute Variability: moderate Accelerations: yes Decelerations: none Assessment: IUP 38w5d; FHR Category I; early labor Plan: Continue to monitor for maternal and fetal wellbeing, epidural to be restarted as indicated then consider AROM, anticipate . Susanne Dinh CNM 
12/3/2018 
7:44 PM 
 
 
 
 
 Manpreet Irwin and manpreet Landers Mem. 10/21/24 @ 8:00.   Patient is aware.